# Patient Record
Sex: MALE
[De-identification: names, ages, dates, MRNs, and addresses within clinical notes are randomized per-mention and may not be internally consistent; named-entity substitution may affect disease eponyms.]

---

## 2021-02-11 ENCOUNTER — APPOINTMENT (OUTPATIENT)
Dept: FAMILY MEDICINE | Facility: CLINIC | Age: 46
End: 2021-02-11
Payer: COMMERCIAL

## 2021-02-11 DIAGNOSIS — R14.0 ABDOMINAL DISTENSION (GASEOUS): ICD-10-CM

## 2021-02-11 DIAGNOSIS — Z13.21 ENCOUNTER FOR SCREENING FOR NUTRITIONAL DISORDER: ICD-10-CM

## 2021-02-11 DIAGNOSIS — Z13.1 ENCOUNTER FOR SCREENING FOR DIABETES MELLITUS: ICD-10-CM

## 2021-02-11 DIAGNOSIS — R13.10 DYSPHAGIA, UNSPECIFIED: ICD-10-CM

## 2021-02-11 DIAGNOSIS — Z13.220 ENCOUNTER FOR SCREENING FOR LIPOID DISORDERS: ICD-10-CM

## 2021-02-11 DIAGNOSIS — Z00.00 ENCOUNTER FOR GENERAL ADULT MEDICAL EXAMINATION W/OUT ABNORMAL FINDINGS: ICD-10-CM

## 2021-02-11 DIAGNOSIS — Z78.9 OTHER SPECIFIED HEALTH STATUS: ICD-10-CM

## 2021-02-11 DIAGNOSIS — Z13.31 ENCOUNTER FOR SCREENING FOR DEPRESSION: ICD-10-CM

## 2021-02-11 PROCEDURE — 96127 BRIEF EMOTIONAL/BEHAV ASSMT: CPT

## 2021-02-11 PROCEDURE — 99386 PREV VISIT NEW AGE 40-64: CPT | Mod: 25

## 2021-02-11 PROCEDURE — 99072 ADDL SUPL MATRL&STAF TM PHE: CPT

## 2021-02-11 RX ORDER — OMEPRAZOLE 40 MG/1
40 CAPSULE, DELAYED RELEASE ORAL
Qty: 30 | Refills: 1 | Status: ACTIVE | COMMUNITY
Start: 2021-02-11 | End: 1900-01-01

## 2021-02-11 NOTE — PLAN
[FreeTextEntry1] : Dysphagia/abd distention\par - GI referral\par - f/u labs\par - trial of omeprazole\par \par HCM\par - f/u labs

## 2021-02-11 NOTE — HISTORY OF PRESENT ILLNESS
[FreeTextEntry1] : establish care, abd distention [de-identified] : 45 year old male presents to establish care. He complains of abdominal distention and difficulty swallowing. It occurs intermittently. Noticed it yesterday with pizza. Last week with chinese food. Concerned for GERD. \par

## 2021-02-12 LAB
25(OH)D3 SERPL-MCNC: 34.4 NG/ML
ALBUMIN SERPL ELPH-MCNC: 4.9 G/DL
ALP BLD-CCNC: 72 U/L
ALT SERPL-CCNC: 28 U/L
AMYLASE/CREAT SERPL: 60 U/L
ANION GAP SERPL CALC-SCNC: 14 MMOL/L
AST SERPL-CCNC: 28 U/L
BASOPHILS # BLD AUTO: 0.03 K/UL
BASOPHILS NFR BLD AUTO: 0.4 %
BILIRUB SERPL-MCNC: 0.4 MG/DL
BUN SERPL-MCNC: 21 MG/DL
CALCIUM SERPL-MCNC: 9.9 MG/DL
CHLORIDE SERPL-SCNC: 102 MMOL/L
CHOLEST SERPL-MCNC: 176 MG/DL
CO2 SERPL-SCNC: 24 MMOL/L
CREAT SERPL-MCNC: 1.03 MG/DL
EOSINOPHIL # BLD AUTO: 0.09 K/UL
EOSINOPHIL NFR BLD AUTO: 1.3 %
ESTIMATED AVERAGE GLUCOSE: 97 MG/DL
GGT SERPL-CCNC: 21 U/L
GLUCOSE SERPL-MCNC: 99 MG/DL
HBA1C MFR BLD HPLC: 5 %
HCT VFR BLD CALC: 46.6 %
HDLC SERPL-MCNC: 61 MG/DL
HGB BLD-MCNC: 15.4 G/DL
IMM GRANULOCYTES NFR BLD AUTO: 0.6 %
LDLC SERPL CALC-MCNC: 96 MG/DL
LPL SERPL-CCNC: 24 U/L
LYMPHOCYTES # BLD AUTO: 2.13 K/UL
LYMPHOCYTES NFR BLD AUTO: 29.9 %
MAN DIFF?: NORMAL
MCHC RBC-ENTMCNC: 30 PG
MCHC RBC-ENTMCNC: 33 GM/DL
MCV RBC AUTO: 90.7 FL
MONOCYTES # BLD AUTO: 0.73 K/UL
MONOCYTES NFR BLD AUTO: 10.2 %
NEUTROPHILS # BLD AUTO: 4.11 K/UL
NEUTROPHILS NFR BLD AUTO: 57.6 %
NONHDLC SERPL-MCNC: 115 MG/DL
PLATELET # BLD AUTO: 203 K/UL
POTASSIUM SERPL-SCNC: 4.7 MMOL/L
PROT SERPL-MCNC: 7.1 G/DL
RBC # BLD: 5.14 M/UL
RBC # FLD: 12.8 %
SODIUM SERPL-SCNC: 140 MMOL/L
TRIGL SERPL-MCNC: 94 MG/DL
TSH SERPL-ACNC: 1.31 UIU/ML
WBC # FLD AUTO: 7.13 K/UL

## 2023-04-13 ENCOUNTER — NON-APPOINTMENT (OUTPATIENT)
Age: 48
End: 2023-04-13

## 2023-04-27 ENCOUNTER — NON-APPOINTMENT (OUTPATIENT)
Age: 48
End: 2023-04-27

## 2025-01-10 ENCOUNTER — NON-APPOINTMENT (OUTPATIENT)
Age: 50
End: 2025-01-10

## 2025-01-15 ENCOUNTER — NON-APPOINTMENT (OUTPATIENT)
Age: 50
End: 2025-01-15

## 2025-02-13 ENCOUNTER — NON-APPOINTMENT (OUTPATIENT)
Age: 50
End: 2025-02-13